# Patient Record
Sex: FEMALE | Race: WHITE | Employment: FULL TIME | URBAN - METROPOLITAN AREA
[De-identification: names, ages, dates, MRNs, and addresses within clinical notes are randomized per-mention and may not be internally consistent; named-entity substitution may affect disease eponyms.]

---

## 2021-02-15 PROBLEM — F41.1 GAD (GENERALIZED ANXIETY DISORDER): Status: ACTIVE | Noted: 2021-02-15

## 2021-02-15 PROBLEM — Z85.6 HISTORY OF LEUKEMIA: Status: ACTIVE | Noted: 2021-02-15

## 2021-02-15 PROBLEM — E55.9 VITAMIN D DEFICIENCY: Status: ACTIVE | Noted: 2021-02-15

## 2021-02-15 PROBLEM — F98.8 ATTENTION DEFICIT DISORDER (ADD) WITHOUT HYPERACTIVITY: Status: ACTIVE | Noted: 2021-02-15

## 2021-04-12 PROBLEM — R10.11 RIGHT UPPER QUADRANT ABDOMINAL PAIN: Status: ACTIVE | Noted: 2021-04-12

## 2021-11-29 ENCOUNTER — APPOINTMENT (OUTPATIENT)
Dept: GENERAL RADIOLOGY | Age: 26
End: 2021-11-29
Attending: NURSE PRACTITIONER
Payer: COMMERCIAL

## 2021-11-29 ENCOUNTER — HOSPITAL ENCOUNTER (OUTPATIENT)
Age: 26
Discharge: HOME | End: 2021-11-29
Payer: COMMERCIAL

## 2021-11-29 VITALS
TEMPERATURE: 98 F | RESPIRATION RATE: 17 BRPM | OXYGEN SATURATION: 99 % | HEART RATE: 98 BPM | SYSTOLIC BLOOD PRESSURE: 121 MMHG | BODY MASS INDEX: 24.11 KG/M2 | WEIGHT: 150 LBS | DIASTOLIC BLOOD PRESSURE: 75 MMHG | HEIGHT: 66 IN

## 2021-11-29 DIAGNOSIS — S92.514A CLOSED NONDISPLACED FRACTURE OF PROXIMAL PHALANX OF LESSER TOE OF RIGHT FOOT, INITIAL ENCOUNTER: Primary | ICD-10-CM

## 2021-11-29 PROCEDURE — 99203 OFFICE O/P NEW LOW 30 MIN: CPT

## 2021-11-29 PROCEDURE — 28510 TREATMENT OF TOE FRACTURE: CPT

## 2021-11-29 PROCEDURE — 73630 X-RAY EXAM OF FOOT: CPT | Performed by: NURSE PRACTITIONER

## 2021-11-29 NOTE — ED INITIAL ASSESSMENT (HPI)
Patient reports that a heavy chair fell on her right foot 2 days ago, and the pain has not subsided.

## 2021-11-29 NOTE — ED PROVIDER NOTES
Patient Seen in: [facility]      History   Patient presents with:   Foot Pain    Stated Complaint: right foor pain,dropped something heavy on it, x2 days    Subjective:   HPI  Patient is 39-year-old female past medical history of leukemia Conjunctiva/sclera: Conjunctivae normal.   Pulmonary:      Effort: Pulmonary effort is normal.   Musculoskeletal:         General: Tenderness present.       Comments: Right Foot Exam;    Very mild ecchymosis dorsal/lateral aspect of right foot  Tenderness t Right foot x-ray; possible healing fracture involving right third proximal phalanx  Impression; toe fracture right third toe proximal phalanx  Plan; Ryan tape/postop shoe.   Ice, elevation, Tylenol or ibuprofen                                     Dispo

## 2022-02-10 PROBLEM — Z47.89 AFTERCARE FOLLOWING SURGERY OF THE MUSCULOSKELETAL SYSTEM: Status: ACTIVE | Noted: 2022-02-10

## 2023-03-18 ENCOUNTER — HOSPITAL ENCOUNTER (EMERGENCY)
Age: 28
Discharge: HOME | End: 2023-03-18
Attending: EMERGENCY MEDICINE
Payer: COMMERCIAL

## 2023-03-18 ENCOUNTER — APPOINTMENT (OUTPATIENT)
Dept: ULTRASOUND IMAGING | Age: 28
End: 2023-03-18
Attending: EMERGENCY MEDICINE
Payer: COMMERCIAL

## 2023-03-18 VITALS
BODY MASS INDEX: 26.52 KG/M2 | WEIGHT: 165 LBS | RESPIRATION RATE: 16 BRPM | SYSTOLIC BLOOD PRESSURE: 113 MMHG | DIASTOLIC BLOOD PRESSURE: 61 MMHG | HEART RATE: 69 BPM | OXYGEN SATURATION: 100 % | HEIGHT: 66 IN | TEMPERATURE: 97 F

## 2023-03-18 DIAGNOSIS — O03.9 MISCARRIAGE AT 8 TO 28 WEEKS GESTATION: Primary | ICD-10-CM

## 2023-03-18 LAB
BILIRUB UR QL STRIP.AUTO: NEGATIVE
COLOR UR AUTO: YELLOW
GLUCOSE UR STRIP.AUTO-MCNC: NEGATIVE MG/DL
KETONES UR STRIP.AUTO-MCNC: 15 MG/DL
LEUKOCYTE ESTERASE UR QL STRIP.AUTO: NEGATIVE
NITRITE UR QL STRIP.AUTO: NEGATIVE
PH UR STRIP.AUTO: 5.5 [PH] (ref 5–8)
PROT UR STRIP.AUTO-MCNC: NEGATIVE MG/DL
RBC UR QL AUTO: NEGATIVE
SP GR UR STRIP.AUTO: >=1.03 (ref 1–1.03)
UROBILINOGEN UR STRIP.AUTO-MCNC: 0.2 MG/DL

## 2023-03-18 PROCEDURE — 76801 OB US < 14 WKS SINGLE FETUS: CPT | Performed by: EMERGENCY MEDICINE

## 2023-03-18 PROCEDURE — 99285 EMERGENCY DEPT VISIT HI MDM: CPT

## 2023-03-18 PROCEDURE — 99284 EMERGENCY DEPT VISIT MOD MDM: CPT

## 2023-03-18 PROCEDURE — 76817 TRANSVAGINAL US OBSTETRIC: CPT | Performed by: EMERGENCY MEDICINE

## 2023-03-18 PROCEDURE — 81001 URINALYSIS AUTO W/SCOPE: CPT | Performed by: EMERGENCY MEDICINE

## 2023-03-18 PROCEDURE — 81015 MICROSCOPIC EXAM OF URINE: CPT | Performed by: EMERGENCY MEDICINE

## 2023-03-18 RX ORDER — MULTIVIT,IRON,MINERALS/LUTEIN
1 TABLET ORAL DAILY
COMMUNITY

## 2023-03-18 NOTE — ED INITIAL ASSESSMENT (HPI)
31 y/o F arrives to ED with c/o lower back pain that started yesterday. Patient denies any vaginal bleeding. Patient reports mild abd cramping; reports she has had these abd cramps throughout the pregnancy. Patient reports US done on March 1st and was normal. Patient reports her OB is through [facility]Dr. Sabi Hurd.   Patient reports GEETA 10/21/23

## 2023-03-24 ENCOUNTER — LAB REQUISITION (OUTPATIENT)
Dept: LAB | Facility: HOSPITAL | Age: 28
End: 2023-03-24
Payer: COMMERCIAL

## 2023-03-24 DIAGNOSIS — O02.1 MISSED ABORTION: ICD-10-CM

## 2023-03-24 PROCEDURE — 88305 TISSUE EXAM BY PATHOLOGIST: CPT | Performed by: OBSTETRICS & GYNECOLOGY

## 2023-10-05 LAB
HEPATITIS B SURFACE ANTIGEN OB: NEGATIVE
HIV RESULT OB: NEGATIVE
RAPID PLASMA REAGIN OB: NONREACTIVE
RH FACTOR OB: POSITIVE

## 2024-01-15 LAB
HIV RESULT OB: NEGATIVE

## 2024-03-06 ENCOUNTER — HOSPITAL ENCOUNTER (OUTPATIENT)
Facility: HOSPITAL | Age: 29
Discharge: HOME | End: 2024-03-06
Attending: OBSTETRICS & GYNECOLOGY | Admitting: OBSTETRICS & GYNECOLOGY
Payer: COMMERCIAL

## 2024-03-06 VITALS
HEART RATE: 104 BPM | TEMPERATURE: 98 F | RESPIRATION RATE: 16 BRPM | SYSTOLIC BLOOD PRESSURE: 106 MMHG | DIASTOLIC BLOOD PRESSURE: 74 MMHG

## 2024-03-06 PROCEDURE — 99213 OFFICE O/P EST LOW 20 MIN: CPT

## 2024-03-06 PROCEDURE — 59025 FETAL NON-STRESS TEST: CPT

## 2024-03-07 NOTE — PROGRESS NOTES
Pt is a 28 year old female admitted to TRG3/TRG3-A.     Chief Complaint   Patient presents with    R/o  Labor     Pt say she is having lower abdominal pain since today morning, denies bleeding and leaking PV, + fetal movement       Pt is  32w2d intra-uterine pregnancy.  History obtained, consents signed. Oriented to room, staff, and plan of care  Report given to Cayla JOHNSON RN .

## 2024-03-07 NOTE — PROGRESS NOTES
Written and verbal discharge instructions given to patient and spouse, questions answered and verbalized understanding of teaching. Patient discharged ambulatory, with all belonging, accompanied by spouse, undelivered, not in active labor. Patient instructed to call the office if patient experiences any urinary symptoms and to follow up at her next scheduled visit. Patient is agreement with plan at this time.   Command auditory hallucinations.

## 2024-03-07 NOTE — NST
Physician Evaluation      NST Interpretation: Reactive    Disposition:   Discharged    Comments:     contractions    Marylu Verdin MD  Nonstress Test   Patient: Annie Byers    Gestation: 32w2d    NST:       Variability: Moderate           Accelerations: Yes           Decelerations: None            Baseline: 150 BPM           Uterine Irritability: Yes           Contractions: Not present                                        Acoustic Stimulator: No           Nonstress Test Interpretation: Reactive           Nonstress Test Second Interpretation: Reactive                     Additional Comments

## 2024-03-07 NOTE — DISCHARGE INSTRUCTIONS
Discharge Instructions    Diet: regular diet/push fluids  Activity: Normal activity         General Instructions    Call your OB doctor if: Fluid leaking from your vagina;Uterine contractions 10 minutes or closer for 1 to 2 hours;Uterine contractions increasing in intensity and frequency;Decrease in fetal movement;Vaginal bleeding;Temperature greater than 100F;Vaginal or rectal pressure  Early labor comfort measures: Drink fluids and eat small light meals;Relax, sleep, take a warm bath or shower for 30 minutes or less;Take a walk           Labor Discharge Instructions    Call your OB doctor if you have any of the following signs:    Period-like cramps that may come and go  Low, dull backache  Pressure in your lower abdomen that may feel like the baby is pushing down  Change in the type or amount of vaginal discharge  Abdominal cramps that may be accompanied by diarrhea  Fluid leaking from your vagina (may or may not be bloody)  Uterine Activity Instructions:

## 2024-04-01 LAB — STREP GP B CULT OB: NEGATIVE

## 2024-04-03 ENCOUNTER — HOSPITAL ENCOUNTER (OUTPATIENT)
Facility: HOSPITAL | Age: 29
Discharge: HOME | End: 2024-04-03
Attending: OBSTETRICS & GYNECOLOGY | Admitting: OBSTETRICS & GYNECOLOGY
Payer: COMMERCIAL

## 2024-04-03 VITALS
WEIGHT: 178 LBS | HEART RATE: 101 BPM | BODY MASS INDEX: 28.61 KG/M2 | TEMPERATURE: 98 F | SYSTOLIC BLOOD PRESSURE: 108 MMHG | RESPIRATION RATE: 16 BRPM | HEIGHT: 66 IN | DIASTOLIC BLOOD PRESSURE: 67 MMHG

## 2024-04-03 PROCEDURE — 59025 FETAL NON-STRESS TEST: CPT

## 2024-04-03 PROCEDURE — 99214 OFFICE O/P EST MOD 30 MIN: CPT

## 2024-04-04 NOTE — DISCHARGE INSTRUCTIONS
Discharge Instructions    Diet: Regular  Activity: Normal activity         General Instructions    Call your OB doctor if: Fluid leaking from your vagina;Uterine contractions 10 minutes or closer for 1 to 2 hours;Uterine contractions increasing in intensity and frequency;Decrease in fetal movement;Vaginal bleeding;Temperature greater than 100F;Vaginal or rectal pressure  Early labor comfort measures: Drink fluids and eat small light meals;Relax, sleep, take a warm bath or shower for 30 minutes or less;Take a walk    Follow up at next scheduled appointment

## 2024-04-04 NOTE — NST
Nonstress Test   Patient: Annie Byers    Gestation: 36w2d    NST:       Variability: Moderate           Accelerations: Yes           Decelerations: Variable (x1, MD aware)          Baseline: 145 BPM           Uterine Irritability: No           Contractions: Irregular                                        Acoustic Stimulator: No           Nonstress Test Interpretation: Reactive           Nonstress Test Second Interpretation: Reactive                     Additional Comments      Physician Evaluation      NST Interpretation: Reactive    Disposition:   Discharged    Comments:    none    Yelena Vitale MD

## 2024-04-04 NOTE — PROGRESS NOTES
Pt is a 28 year old female admitted to TRG4/TRG4-A.     Chief Complaint   Patient presents with    R/o Rom      Pt is  36w2d intra-uterine pregnancy.  History obtained, consents signed. Oriented to room, staff, and plan of care. Pt c/o possible SROM at 0830. States she's been leaking small amounts of watery discharge all day, had to change her underwear twice. Denies large gush or need for a pad. Denies regular ctx, vaginal bleeding. No obvious LOF upon arrival.     EFM tested and applied, FHTs tracing and audible in 160s. Abdomen soft and non-tender.

## 2024-04-04 NOTE — PROGRESS NOTES
Discharge instructions given and explained, pt verbalizes understanding and agrees. Aware she is to f/u in office at next scheduled appointment. Pt ambulatory and discharged home accompanied by significant other.

## 2024-04-16 ENCOUNTER — TELEPHONE (OUTPATIENT)
Dept: OBGYN UNIT | Facility: HOSPITAL | Age: 29
End: 2024-04-16

## 2024-04-19 ENCOUNTER — HOSPITAL ENCOUNTER (INPATIENT)
Facility: HOSPITAL | Age: 29
LOS: 3 days | Discharge: HOME | End: 2024-04-22
Attending: OBSTETRICS & GYNECOLOGY | Admitting: OBSTETRICS & GYNECOLOGY
Payer: COMMERCIAL

## 2024-04-19 PROBLEM — Z34.90 PREGNANCY (HCC): Status: ACTIVE | Noted: 2024-04-19

## 2024-04-19 LAB
ANTIBODY SCREEN: NEGATIVE
BASOPHILS # BLD AUTO: 0.02 X10(3) UL (ref 0–0.2)
BASOPHILS NFR BLD AUTO: 0.2 %
EOSINOPHIL # BLD AUTO: 0.02 X10(3) UL (ref 0–0.7)
EOSINOPHIL NFR BLD AUTO: 0.2 %
ERYTHROCYTE [DISTWIDTH] IN BLOOD BY AUTOMATED COUNT: 13.1 %
HCT VFR BLD AUTO: 33.3 %
HGB BLD-MCNC: 11.7 G/DL
HIV 1+2 AB+HIV1 P24 AG SERPL QL IA: NONREACTIVE
IMM GRANULOCYTES # BLD AUTO: 0.06 X10(3) UL (ref 0–1)
IMM GRANULOCYTES NFR BLD: 0.6 %
LYMPHOCYTES # BLD AUTO: 1.82 X10(3) UL (ref 1–4)
LYMPHOCYTES NFR BLD AUTO: 18.9 %
MCH RBC QN AUTO: 30.9 PG (ref 26–34)
MCHC RBC AUTO-ENTMCNC: 35.1 G/DL (ref 31–37)
MCV RBC AUTO: 87.9 FL
MONOCYTES # BLD AUTO: 0.52 X10(3) UL (ref 0.1–1)
MONOCYTES NFR BLD AUTO: 5.4 %
NEUTROPHILS # BLD AUTO: 7.17 X10 (3) UL (ref 1.5–7.7)
NEUTROPHILS # BLD AUTO: 7.17 X10(3) UL (ref 1.5–7.7)
NEUTROPHILS NFR BLD AUTO: 74.7 %
PLATELET # BLD AUTO: 203 10(3)UL (ref 150–450)
RBC # BLD AUTO: 3.79 X10(6)UL
RH BLOOD TYPE: POSITIVE
T PALLIDUM AB SER QL IA: NONREACTIVE
WBC # BLD AUTO: 9.6 X10(3) UL (ref 4–11)

## 2024-04-19 PROCEDURE — 86850 RBC ANTIBODY SCREEN: CPT | Performed by: OBSTETRICS & GYNECOLOGY

## 2024-04-19 PROCEDURE — 99214 OFFICE O/P EST MOD 30 MIN: CPT

## 2024-04-19 PROCEDURE — 3E0DXGC INTRODUCTION OF OTHER THERAPEUTIC SUBSTANCE INTO MOUTH AND PHARYNX, EXTERNAL APPROACH: ICD-10-PCS | Performed by: OBSTETRICS & GYNECOLOGY

## 2024-04-19 PROCEDURE — 85025 COMPLETE CBC W/AUTO DIFF WBC: CPT | Performed by: OBSTETRICS & GYNECOLOGY

## 2024-04-19 PROCEDURE — 86701 HIV-1ANTIBODY: CPT | Performed by: OBSTETRICS & GYNECOLOGY

## 2024-04-19 PROCEDURE — 86900 BLOOD TYPING SEROLOGIC ABO: CPT | Performed by: OBSTETRICS & GYNECOLOGY

## 2024-04-19 PROCEDURE — 86901 BLOOD TYPING SEROLOGIC RH(D): CPT | Performed by: OBSTETRICS & GYNECOLOGY

## 2024-04-19 PROCEDURE — 86780 TREPONEMA PALLIDUM: CPT | Performed by: OBSTETRICS & GYNECOLOGY

## 2024-04-19 RX ORDER — IBUPROFEN 600 MG/1
600 TABLET ORAL ONCE AS NEEDED
Status: COMPLETED | OUTPATIENT
Start: 2024-04-19 | End: 2024-04-20

## 2024-04-19 RX ORDER — NALBUPHINE HYDROCHLORIDE 10 MG/ML
2.5 INJECTION, SOLUTION INTRAMUSCULAR; INTRAVENOUS; SUBCUTANEOUS
Status: DISCONTINUED | OUTPATIENT
Start: 2024-04-19 | End: 2024-04-20

## 2024-04-19 RX ORDER — TERBUTALINE SULFATE 1 MG/ML
0.25 INJECTION, SOLUTION SUBCUTANEOUS AS NEEDED
Status: DISCONTINUED | OUTPATIENT
Start: 2024-04-19 | End: 2024-04-20

## 2024-04-19 RX ORDER — ONDANSETRON 2 MG/ML
4 INJECTION INTRAMUSCULAR; INTRAVENOUS EVERY 6 HOURS PRN
Status: DISCONTINUED | OUTPATIENT
Start: 2024-04-19 | End: 2024-04-20

## 2024-04-19 RX ORDER — BUPIVACAINE HCL/0.9 % NACL/PF 0.25 %
5 PLASTIC BAG, INJECTION (ML) EPIDURAL AS NEEDED
Status: DISCONTINUED | OUTPATIENT
Start: 2024-04-19 | End: 2024-04-20

## 2024-04-19 RX ORDER — ACETAMINOPHEN 500 MG
500 TABLET ORAL EVERY 6 HOURS PRN
Status: DISCONTINUED | OUTPATIENT
Start: 2024-04-19 | End: 2024-04-20

## 2024-04-19 RX ORDER — SODIUM CHLORIDE, SODIUM LACTATE, POTASSIUM CHLORIDE, CALCIUM CHLORIDE 600; 310; 30; 20 MG/100ML; MG/100ML; MG/100ML; MG/100ML
INJECTION, SOLUTION INTRAVENOUS CONTINUOUS
Status: DISCONTINUED | OUTPATIENT
Start: 2024-04-19 | End: 2024-04-20

## 2024-04-19 RX ORDER — DEXTROSE, SODIUM CHLORIDE, SODIUM LACTATE, POTASSIUM CHLORIDE, AND CALCIUM CHLORIDE 5; .6; .31; .03; .02 G/100ML; G/100ML; G/100ML; G/100ML; G/100ML
INJECTION, SOLUTION INTRAVENOUS AS NEEDED
Status: DISCONTINUED | OUTPATIENT
Start: 2024-04-19 | End: 2024-04-20

## 2024-04-19 RX ORDER — HYDROMORPHONE HYDROCHLORIDE 1 MG/ML
1 INJECTION, SOLUTION INTRAMUSCULAR; INTRAVENOUS; SUBCUTANEOUS EVERY 2 HOUR PRN
Status: DISCONTINUED | OUTPATIENT
Start: 2024-04-19 | End: 2024-04-20

## 2024-04-19 RX ORDER — ACETAMINOPHEN 500 MG
1000 TABLET ORAL EVERY 6 HOURS PRN
Status: DISCONTINUED | OUTPATIENT
Start: 2024-04-19 | End: 2024-04-20

## 2024-04-19 RX ORDER — CITRIC ACID/SODIUM CITRATE 334-500MG
30 SOLUTION, ORAL ORAL AS NEEDED
Status: DISCONTINUED | OUTPATIENT
Start: 2024-04-19 | End: 2024-04-20

## 2024-04-19 NOTE — H&P
[facility]  Labor and Delivery Prenatal History and Physical Interval Addendum  Please see full Prenatal Record for this pregnancy      SUBJECTIVE:    Interval History:     This is a pregnancy at 38 4/7 weeks admitted for labor induction.  She arrived in triage to assess possible labor, but contractions are mild.  During course of her evaluation, some variable FHR decelerations were noted.    Growth ultrasound 5 w ago 37% and earlier this week normal growth and HASEEB 16.    OBJECTIVE:    The patient is comfortable    Fetal Surveillance:  Fetal heart variability: moderate with accelerations qualifying for reactivity. However, throughout over an hour of monitoring, she has instances of usually brief but occasionally slightly longer variable decelerations with good recovery.    Cervix:  Per RN initial assessment, cervix FT dilated.  On my re-exam, cervix very posterior and cannot verify dilation, feels closed  Effacement 50%  Station -1  cephalic      ASSESSMENT/PLAN:    38 weeks, mild contractions, persistent occasional variable FHR decelerations.  I discussed w/pt and partner the pros and cons of observing her condition versus initiating labor induction. At this gestational age, I feel it is safer to proceed with induction and they are in agreement.   Gbs status: neg  Problems pertinent to admission:  - suspected absent 5th digit on fetal left foot on earlier ultrasound. In February, however, all digits appeared normal  - maternal hx anxiety

## 2024-04-19 NOTE — PLAN OF CARE
Problem: BIRTH - VAGINAL/ SECTION  Goal: Fetal and maternal status remain reassuring during the birth process  Description: INTERVENTIONS:  - Monitor vital signs  - Monitor fetal heart rate  - Monitor uterine activity  - Monitor labor progression (vaginal delivery)  - DVT prophylaxis (C/S delivery)  - Surgical antibiotic prophylaxis (C/S delivery)  Outcome: Progressing     Problem: PAIN - ADULT  Goal: Verbalizes/displays adequate comfort level or patient's stated pain goal  Description: INTERVENTIONS:  - Encourage pt to monitor pain and request assistance  - Assess pain using appropriate pain scale  - Administer analgesics based on type and severity of pain and evaluate response  - Implement non-pharmacological measures as appropriate and evaluate response  - Consider cultural and social influences on pain and pain management  - Manage/alleviate anxiety  - Utilize distraction and/or relaxation techniques  - Monitor for opioid side effects  - Notify MD/LIP if interventions unsuccessful or patient reports new pain  - Anticipate increased pain with activity and pre-medicate as appropriate  Outcome: Progressing     Problem: ANXIETY  Goal: Will report anxiety at manageable levels  Description: INTERVENTIONS:  - Administer medication as ordered  - Teach and rehearse alternative coping skills  - Provide emotional support with 1:1 interaction with staff  Outcome: Progressing     Problem: Patient/Family Goals  Goal: Patient/Family Long Term Goal  Description: Patient's Long Term Goal: Uncomplicated vaginal delivery    Interventions:  VS per protocol  I&O  Ice chips and sips as tolerated  EFM per protocol  Maintain IV as ordered  Antibiotics as needed per protocol  Informed consent    Interventions:  -   - See additional Care Plan goals for specific interventions  Outcome: Progressing  Goal: Patient/Family Short Term Goal  Description: Patient's Short Term Goal: Adequate pain control with delivery of  infant  Interventions:  Pain assessment scores as ordered  Patient scores pain a \"3\" or less  Multidisciplinary care   Nonpharmacologic comfort measures    Interventions:   -   - See additional Care Plan goals for specific interventions  Outcome: Progressing

## 2024-04-19 NOTE — PROGRESS NOTES
Pt is a 28 year old female admitted to TRG3/TRG3-A.     Chief Complaint   Patient presents with    R/o Labor     Patient presents with regular UC since 0900.  Denies bleeding or LOF.  + FM      Pt is  38w4d intra-uterine pregnancy.  History obtained, consents signed. Oriented to room, staff, and plan of care.

## 2024-04-20 ENCOUNTER — ANESTHESIA (OUTPATIENT)
Dept: OBGYN UNIT | Facility: HOSPITAL | Age: 29
End: 2024-04-20
Payer: COMMERCIAL

## 2024-04-20 ENCOUNTER — ANESTHESIA EVENT (OUTPATIENT)
Dept: OBGYN UNIT | Facility: HOSPITAL | Age: 29
End: 2024-04-20
Payer: COMMERCIAL

## 2024-04-20 PROCEDURE — 0HQ9XZZ REPAIR PERINEUM SKIN, EXTERNAL APPROACH: ICD-10-PCS | Performed by: OBSTETRICS & GYNECOLOGY

## 2024-04-20 PROCEDURE — 3E033VJ INTRODUCTION OF OTHER HORMONE INTO PERIPHERAL VEIN, PERCUTANEOUS APPROACH: ICD-10-PCS | Performed by: OBSTETRICS & GYNECOLOGY

## 2024-04-20 RX ORDER — ACETAMINOPHEN 500 MG
1000 TABLET ORAL EVERY 6 HOURS PRN
Status: DISCONTINUED | OUTPATIENT
Start: 2024-04-20 | End: 2024-04-22

## 2024-04-20 RX ORDER — DOCUSATE SODIUM 100 MG/1
100 CAPSULE, LIQUID FILLED ORAL
Status: DISCONTINUED | OUTPATIENT
Start: 2024-04-20 | End: 2024-04-22

## 2024-04-20 RX ORDER — SIMETHICONE 80 MG
80 TABLET,CHEWABLE ORAL 3 TIMES DAILY PRN
Status: DISCONTINUED | OUTPATIENT
Start: 2024-04-20 | End: 2024-04-22

## 2024-04-20 RX ORDER — IBUPROFEN 600 MG/1
600 TABLET ORAL EVERY 6 HOURS
Status: DISCONTINUED | OUTPATIENT
Start: 2024-04-20 | End: 2024-04-22

## 2024-04-20 RX ORDER — BISACODYL 10 MG
10 SUPPOSITORY, RECTAL RECTAL ONCE AS NEEDED
Status: DISCONTINUED | OUTPATIENT
Start: 2024-04-20 | End: 2024-04-22

## 2024-04-20 RX ORDER — LIDOCAINE HYDROCHLORIDE 10 MG/ML
INJECTION, SOLUTION EPIDURAL; INFILTRATION; INTRACAUDAL; PERINEURAL AS NEEDED
Status: DISCONTINUED | OUTPATIENT
Start: 2024-04-20 | End: 2024-04-20 | Stop reason: SURG

## 2024-04-20 RX ORDER — ACETAMINOPHEN 500 MG
500 TABLET ORAL EVERY 6 HOURS PRN
Status: DISCONTINUED | OUTPATIENT
Start: 2024-04-20 | End: 2024-04-22

## 2024-04-20 RX ORDER — LIDOCAINE HYDROCHLORIDE AND EPINEPHRINE 15; 5 MG/ML; UG/ML
INJECTION, SOLUTION EPIDURAL AS NEEDED
Status: DISCONTINUED | OUTPATIENT
Start: 2024-04-20 | End: 2024-04-20 | Stop reason: SURG

## 2024-04-20 RX ADMIN — LIDOCAINE HYDROCHLORIDE 10 MG: 10 INJECTION, SOLUTION EPIDURAL; INFILTRATION; INTRACAUDAL; PERINEURAL at 13:07:00

## 2024-04-20 RX ADMIN — LIDOCAINE HYDROCHLORIDE AND EPINEPHRINE 5 ML: 15; 5 INJECTION, SOLUTION EPIDURAL at 13:17:00

## 2024-04-20 NOTE — PROGRESS NOTES
Labor Progress Note  Feeling mild cramping, tolerating well at this time without pain interventions.  No leaking of fluid or vaginal bleeding.  No other complaints     Temp:  [97.4 °F (36.3 °C)-98.3 °F (36.8 °C)] 97.4 °F (36.3 °C)  Pulse:  [] 93  Resp:  [16-18] 16  BP: (116-123)/(71-77) 116/77  FHT:  baseline 135bpm, moderate variability, accels appreciated, no decels   [place]onda:  Contractions not tracing well   SVE: 0-1cm/0%/-3sta    Recent Labs   Lab 24  1629   RBC 3.79*   HGB 11.7*   HCT 33.3*   MCV 87.9   MCH 30.9   MCHC 35.1   RDW 13.1   NEPRELIM 7.17   WBC 9.6   .0       ASSESSMENT/PLAN:  Annie Byers is a 28 year old  at 38w4d admitted for IOL due to fetal heart rate decelerations.    FHT currently category 1.  Cytotec #1 placed at 1609, reassess SVE after .  Anticipate placement of second cytotec.   Analgesics/epidural at patient request.     Willem Grijalva,      Hospital stay: 0

## 2024-04-20 NOTE — PLAN OF CARE
Problem: BIRTH - VAGINAL/ SECTION  Goal: Fetal and maternal status remain reassuring during the birth process  Description: INTERVENTIONS:  - Monitor vital signs  - Monitor fetal heart rate  - Monitor uterine activity  - Monitor labor progression (vaginal delivery)  - DVT prophylaxis (C/S delivery)  - Surgical antibiotic prophylaxis (C/S delivery)  2024 by Rhys Howard RN  Outcome: Progressing  2024 by Rhys Howard RN  Outcome: Progressing     Problem: PAIN - ADULT  Goal: Verbalizes/displays adequate comfort level or patient's stated pain goal  Description: INTERVENTIONS:  - Encourage pt to monitor pain and request assistance  - Assess pain using appropriate pain scale  - Administer analgesics based on type and severity of pain and evaluate response  - Implement non-pharmacological measures as appropriate and evaluate response  - Consider cultural and social influences on pain and pain management  - Manage/alleviate anxiety  - Utilize distraction and/or relaxation techniques  - Monitor for opioid side effects  - Notify MD/LIP if interventions unsuccessful or patient reports new pain  - Anticipate increased pain with activity and pre-medicate as appropriate  2024 by Rhys Howard RN  Outcome: Progressing  2024 by Rhys Howard RN  Outcome: Progressing     Problem: ANXIETY  Goal: Will report anxiety at manageable levels  Description: INTERVENTIONS:  - Administer medication as ordered  - Teach and rehearse alternative coping skills  - Provide emotional support with 1:1 interaction with staff  2024 by hRys Howard RN  Outcome: Progressing  2024 by Rhys Howard RN  Outcome: Progressing     Problem: Patient/Family Goals  Goal: Patient/Family Long Term Goal  Description: Patient's Long Term Goal: Uncomplicated vaginal delivery     Interventions:   VS per protocol   I&O   Ice chips and sips as tolerated   EFM per protocol   Maintain IV as ordered    Antibiotics as needed per protocol   Informed consent   - See additional Care Plan goals for specific interventions  4/20/2024 0234 by Rhys Howard, RN  Outcome: Progressing  4/20/2024 0233 by Rhys Howard, RN  Outcome: Progressing  Goal: Patient/Family Short Term Goal  Description: Patient's Short Term Goal: pain management     Interventions:   - breathing techniques  -repositioning  -relaxation techniques   - See additional Care Plan goals for specific interventions  4/20/2024 0234 by Rhys Howard, RN  Outcome: Progressing  4/20/2024 0233 by Rhys Howard, RN  Outcome: Progressing

## 2024-04-20 NOTE — PROGRESS NOTES
Pt is a 28 year old female admitted to 113/113-A.     Chief Complaint   Patient presents with    R/o Labor     Patient presents with regular UC since 0900.  Denies bleeding or LOF.  + FM      Pt is  38w4d intra-uterine pregnancy.  History obtained, consents signed. Oriented to room, staff, and plan of care.     EFM tested and applied. Abdomen soft and non-tender.

## 2024-04-20 NOTE — L&D DELIVERY NOTE
Jaye Byers [[id number]]      Labor Events     labor?: No   steroids?: None  Antibiotics received during labor?: No  Rupture date/time: 2024 1215     Rupture type: SROM  Fluid color: Clear  Labor type: Induced Onset of Labor  Induction: Misoprostol, Oxytocin  Indications for induction: Fetal Heart Rate or Rhythm Abnormality  Induction comment: variables       Labor Event Times    Labor onset date/time: 2024 1345  Dilation complete date/time: 2024 1803        Presentation    No data filed       Operative Delivery    Operative Vaginal Delivery: No                Shoulder Dystocia    Shoulder Dystocia: No       Anesthesia    Method: Epidural               Delivery      Head delivery date/time: 2024 18:46:28   Delivery date/time:  24 18:46:43   Delivery type: Normal spontaneous vaginal delivery    Details:     Delivery location: delivery room       Delivery Providers    Delivering Clinician: Fabio Valadez MD   Delivery personnel:  Provider Role   Vanita Voss, RN Baby Nurse   Dori Bansal, RN Delivery Nurse             Cord    Vessels: 3 Vessels  Complications: Nuchal  # of loops: 1  Timed cord clamping: Yes  Time in sec: 30  Cord blood disposition: to lab  Gases sent?: No        Measurements    No data filed       Placenta    Date/time: 2024 1849  Removal: Spontaneous  Appearance: Intact  Disposition: held for future pathology       Apgars    Living status: Living   Apgar Scoring Key:    0 1 2    Skin color Blue or pale Acrocyanotic Completely pink    Heart rate Absent <100 bpm >100 bpm    Reflex irritability No response Grimace Cry or active withdrawal    Muscle tone Limp Some flexion Active motion    Respiratory effort Absent Weak cry; hypoventilation Good, crying              1 Minute:  5 Minute:  10 Minute:  15 Minute:  20 Minute:      Skin color:        Heart rate:        Reflex irritablity:        Muscle tone:        Respiratory effort:         Total:                Skin to Skin    No data filed       Vaginal Count    No data filed       Lacerations      Perineal lacerations: 1st Repaired?: Yes     Periurethral laceration: right Repaired?: Yes               Pt is a28 year old y/o, [id number] 38w5d . Normal spontaneous vaginal delivery of female infant. TOB-18:46, Apgar-7,9 .  First degree perineal laceration repaired with 3-0 vicryl. Shannon-clitoral lac repaired with 4-0 vicryl. Placenta delivered spontaneously and intact.  Cord vessel x3.  epidural for anesthesia.  EBL-150cc.  No complications

## 2024-04-20 NOTE — ANESTHESIA PROCEDURE NOTES
Labor Analgesia    Date/Time: 4/20/2024 1:05 PM    Performed by: Gregorio Scott MD  Authorized by: Gregorio Scott MD      General Information and Staff    Start Time:  4/20/2024 1:05 PM  End Time:  4/20/2024 1:17 PM  Anesthesiologist:  Gregorio Scott MD  Performed by:  Anesthesiologist  Patient Location:  OB  Site Identification: surface landmarks    Reason for Block: labor epidural    Preanesthetic Checklist: patient identified, IV checked, risks and benefits discussed, monitors and equipment checked, pre-op evaluation, timeout performed, IV bolus, anesthesia consent and sterile technique used      Procedure Details    Patient Position:  Sitting  Prep: ChloraPrep    Monitoring:  Heart rate and continuous pulse ox  Approach:  Midline    Epidural Needle    Injection Technique:  ROSA air  Needle Type:  Tuohy  Needle Gauge:  17 G  Needle Length:  3.375 in  Needle Insertion Depth:  6  Location:  L3-4    Spinal Needle      Catheter    Catheter Type:  End hole  Catheter Size:  19 G  Test Dose:  Negative    Assessment    Sensory Level:  T8    Additional Comments

## 2024-04-20 NOTE — ANESTHESIA PREPROCEDURE EVALUATION
PRE-OP EVALUATION    Patient Name: Annie Byers    Admit Diagnosis: pregnancy  Pregnancy (HCC)    Pre-op Diagnosis: * No pre-op diagnosis entered *        Anesthesia Procedure: LABOR ANALGESIA    * No surgeons found in log *    Pre-op vitals reviewed.  Temp: 97.8 °F (36.6 °C)  Pulse: 80  Resp: 18  BP: 106/52     Body mass index is 28.41 kg/m².    Current medications reviewed.  Hospital Medications:  • lactated ringers infusion   Intravenous Continuous   • dextrose in lactated ringers 5% infusion   Intravenous PRN   • lactated ringers IV bolus 500 mL  500 mL Intravenous PRN   • acetaminophen (Tylenol Extra Strength) tab 500 mg  500 mg Oral Q6H PRN   • acetaminophen (Tylenol Extra Strength) tab 1,000 mg  1,000 mg Oral Q6H PRN   • ibuprofen (Motrin) tab 600 mg  600 mg Oral Once PRN   • ondansetron (Zofran) 4 MG/2ML injection 4 mg  4 mg Intravenous Q6H PRN   • oxyTOCIN in sodium chloride 0.9% (Pitocin) 30 Units/500mL infusion premix  62.5-900 ren-units/min Intravenous Continuous   • terbutaline (Brethine) 1 MG/ML injection 0.25 mg  0.25 mg Subcutaneous PRN   • sodium citrate-citric acid (Bicitra) 500-334 MG/5ML oral solution 30 mL  30 mL Oral PRN   • misoprostol (CYTOTEC) partial tablets 25 mcg  25 mcg Vaginal 6 times per day   • oxyTOCIN in sodium chloride 0.9% (Pitocin) 30 Units/500mL infusion premix  0.5-20 ren-units/min Intravenous Continuous   • HYDROmorphone (Dilaudid) 1 MG/ML injection 1 mg  1 mg Intravenous Q2H PRN   • [COMPLETED] lactated ringers IV bolus 1,000 mL  1,000 mL Intravenous Once   • fentaNYL-bupivacaine 2 mcg/mL-0.125% in sodium chloride 0.9% 100 mL EPIDURAL infusion premix  12 mL/hr Epidural Continuous   • [COMPLETED] fentaNYL (Sublimaze) 50 mcg/mL injection 100 mcg  100 mcg Epidural Once   • EPHEDrine (PF) 25 MG/5 ML injection 5 mg  5 mg Intravenous PRN   • nalbuphine (Nubain) 10 mg/mL injection 2.5 mg  2.5 mg Intravenous Q15 Min PRN       Outpatient Medications:     Medications Prior to  Admission   Medication Sig Dispense Refill Last Dose   • sertraline (ZOLOFT) 50 MG Oral Tab Take 1 tablet (50 mg total) by mouth every evening. 90 tablet 0 4/18/2024   • Calcium Carbonate 600 MG Oral Tab Take 1 tablet (600 mg total) by mouth.   4/18/2024   • Multiple Vitamins-Minerals (MULTIPLE VITAMINS/WOMENS) Oral Tab Take 1 tablet by mouth daily.   4/18/2024   • ergocalciferol 1.25 MG (96503 UT) Oral Cap Take 1 capsule (50,000 Units total) by mouth once a week. 16 capsule 0 4/18/2024   • Prenatal Vit-Fe Fumarate-FA (MULTI PRENATAL) 27-0.8 MG Oral Tab Take 1 tablet by mouth daily.      • cetirizine (ZYRTEC ALLERGY) 10 MG Oral Tab Take 1 tablet (10 mg total) by mouth daily. (Patient not taking: Reported on 3/18/2023) 30 tablet 1        Allergies: Patient has no known allergies.      Anesthesia Evaluation    Patient summary reviewed.    Anesthetic Complications           GI/Hepatic/Renal                                 Cardiovascular                                                       Endo/Other                                  Pulmonary                           Neuro/Psych      (+) depression                              Past Surgical History:   Procedure Laterality Date   • Foot surgery     • Port a cath access total     • Toe surgery Right 01/2022     Social History     Socioeconomic History   • Marital status:    Tobacco Use   • Smoking status: Never     Passive exposure: Never   • Smokeless tobacco: Never   Vaping Use   • Vaping status: Never Used   Substance and Sexual Activity   • Alcohol use: Not Currently     Comment: rare alcohol use, 2 x month   • Drug use: No     History   Drug Use No     Available pre-op labs reviewed.  Lab Results   Component Value Date    WBC 9.6 04/19/2024    RBC 3.79 (L) 04/19/2024    HGB 11.7 (L) 04/19/2024    HCT 33.3 (L) 04/19/2024    MCV 87.9 04/19/2024    MCH 30.9 04/19/2024    MCHC 35.1 04/19/2024    RDW 13.1 04/19/2024    .0 04/19/2024                Airway      Mallampati: II  Mouth opening: 3 FB  TM distance: > 6 cm  Neck ROM: full Cardiovascular    Cardiovascular exam normal.  Rhythm: regular  Rate: normal     Dental             Pulmonary    Pulmonary exam normal.                 Other findings        ASA: 2   Plan: epidural  NPO status verified and patient meets guidelines.          Plan/risks discussed with: patient and spouse            Present on Admission:  **None**

## 2024-04-20 NOTE — PROGRESS NOTES
Labor Progress Note  Feeling contractions regularly now, uncomfortable.  Just received dilaudid.  No leaking of fluid.  Jenny too much for another cytotec.  FB offered, she declines at this time.     Temp:  [97.4 °F (36.3 °C)-98.4 °F (36.9 °C)] 98.2 °F (36.8 °C)  Pulse:  [] 60  Resp:  [16-18] 18  BP: (116-123)/(66-77) 120/67  FHT:  baseline 125s, mod variability, accels appreciated, no decels  [place]:  contractions q2 minutes   SVE: 1cm/50%/-3sta    Recent Labs   Lab 24  1629   RBC 3.79*   HGB 11.7*   HCT 33.3*   MCV 87.9   MCH 30.9   MCHC 35.1   RDW 13.1   NEPRELIM 7.17   WBC 9.6   .0       ASSESSMENT/PLAN:  Annie Byers is a 28 year old  at 38w5d admitted with IOL due to fetal heart rate decelerations.    S/p cytotec x3  Cervix /50/-3, ctx Q2min  Jenny too much for another cytotec, FB offered and declined.  Will expectantly manage for next 1-2 hrs.     Willem Grijalva DO     Hospital stay: 1

## 2024-04-20 NOTE — PROGRESS NOTES
Report given to KANCHAN Meadows and KANCHAN Waldron. POC discussed with patient at bedside. All questions answered at this time.

## 2024-04-20 NOTE — PROGRESS NOTES
SUBJECTIVE:   pt without complaints.  Having some mild to moderate contractions.     OBJECTIVE:  VS:  height is 5' 6\" (1.676 m) and weight is 176 lb (79.8 kg). Her oral temperature is 97.8 °F (36.6 °C). Her blood pressure is 116/61 and her pulse is 87. Her respiration is 16.     Fetal Surveillance:  Fetal heart variability: moderate  Fetal Heart Rate accelerations: yes  Baseline FHR: 140 per minute  Uterine contractions: regular, every 3-5 minutes    Cervix:  Dilation:1cm  Effacement: 80%  Station:-1    ASSESSMENT/PLAN:    1.28 year old y/o, [id number] 38w5d  2. FWB-reassuring.   3. Start pitocin in 1 hour  4. Expect

## 2024-04-20 NOTE — PLAN OF CARE
Problem: BIRTH - VAGINAL/ SECTION  Goal: Fetal and maternal status remain reassuring during the birth process  Description: INTERVENTIONS:  - Monitor vital signs  - Monitor fetal heart rate  - Monitor uterine activity  - Monitor labor progression (vaginal delivery)  - DVT prophylaxis (C/S delivery)  - Surgical antibiotic prophylaxis (C/S delivery)  Outcome: Progressing     Problem: PAIN - ADULT  Goal: Verbalizes/displays adequate comfort level or patient's stated pain goal  Description: INTERVENTIONS:  - Encourage pt to monitor pain and request assistance  - Assess pain using appropriate pain scale  - Administer analgesics based on type and severity of pain and evaluate response  - Implement non-pharmacological measures as appropriate and evaluate response  - Consider cultural and social influences on pain and pain management  - Manage/alleviate anxiety  - Utilize distraction and/or relaxation techniques  - Monitor for opioid side effects  - Notify MD/LIP if interventions unsuccessful or patient reports new pain  - Anticipate increased pain with activity and pre-medicate as appropriate  Outcome: Progressing     Problem: ANXIETY  Goal: Will report anxiety at manageable levels  Description: INTERVENTIONS:  - Administer medication as ordered  - Teach and rehearse alternative coping skills  - Provide emotional support with 1:1 interaction with staff  Outcome: Progressing     Problem: Patient/Family Goals  Goal: Patient/Family Long Term Goal  Description: Patient's Long Term Goal: Uncomplicated vaginal delivery     Interventions:   VS per protocol   I&O   Ice chips and sips as tolerated   EFM per protocol   Maintain IV as ordered   Antibiotics as needed per protocol   Informed consent   - See additional Care Plan goals for specific interventions  Outcome: Progressing  Goal: Patient/Family Short Term Goal  Description: Patient's Short Term Goal: pain management     Interventions:   - breathing  techniques  -repositioning  -relaxation techniques   - See additional Care Plan goals for specific interventions  Outcome: Progressing

## 2024-04-21 LAB
BASOPHILS # BLD AUTO: 0.02 X10(3) UL (ref 0–0.2)
BASOPHILS NFR BLD AUTO: 0.1 %
EOSINOPHIL # BLD AUTO: 0.03 X10(3) UL (ref 0–0.7)
EOSINOPHIL NFR BLD AUTO: 0.2 %
ERYTHROCYTE [DISTWIDTH] IN BLOOD BY AUTOMATED COUNT: 13 %
HCT VFR BLD AUTO: 31.5 %
HGB BLD-MCNC: 10.2 G/DL
IMM GRANULOCYTES # BLD AUTO: 0.08 X10(3) UL (ref 0–1)
IMM GRANULOCYTES NFR BLD: 0.6 %
LYMPHOCYTES # BLD AUTO: 1.8 X10(3) UL (ref 1–4)
LYMPHOCYTES NFR BLD AUTO: 13.4 %
MCH RBC QN AUTO: 30.2 PG (ref 26–34)
MCHC RBC AUTO-ENTMCNC: 32.4 G/DL (ref 31–37)
MCV RBC AUTO: 93.2 FL
MONOCYTES # BLD AUTO: 0.86 X10(3) UL (ref 0.1–1)
MONOCYTES NFR BLD AUTO: 6.4 %
NEUTROPHILS # BLD AUTO: 10.62 X10 (3) UL (ref 1.5–7.7)
NEUTROPHILS # BLD AUTO: 10.62 X10(3) UL (ref 1.5–7.7)
NEUTROPHILS NFR BLD AUTO: 79.3 %
PLATELET # BLD AUTO: 193 10(3)UL (ref 150–450)
RBC # BLD AUTO: 3.38 X10(6)UL
WBC # BLD AUTO: 13.4 X10(3) UL (ref 4–11)

## 2024-04-21 PROCEDURE — 85025 COMPLETE CBC W/AUTO DIFF WBC: CPT | Performed by: OBSTETRICS & GYNECOLOGY

## 2024-04-21 NOTE — PLAN OF CARE
Problem: POSTPARTUM  Goal: Optimize infant feeding at the breast  Description: INTERVENTIONS:  - Initiate breast feeding within first hour after birth.   - Monitor effectiveness of current breast feeding efforts.  - Assess support systems available to mother/family.  - Identify cultural beliefs/practices regarding lactation, letdown techniques, maternal food preferences.  - Assess mother's knowledge and previous experience with breast feeding.  - Provide information as needed about early infant feeding cues (e.g., rooting, lip smacking, sucking fingers/hand) versus late cue of crying.  - Discuss/demonstrate breast feeding aids (e.g., infant sling, nursing footstool/pillows, and breast pumps).  - Encourage mother/other family members to express feelings/concerns, and actively listen.  - Educate father/SO about benefits of breast feeding and how to manage common lactation challenges.  - Recommend avoidance of specific medications or substances incompatible with breast feeding.  - Assess and monitor for signs of nipple pain/trauma.  - Instruct and provide assistance with proper latch.  - Review techniques for milk expression (breast pumping) and storage of breast milk. Provide pumping equipment/supplies, instructions and assistance, as needed.  - Encourage rooming-in and breast feeding on demand.  - Encourage skin-to-skin contact.  - Provide LC support as needed.  - Assess for and manage engorgement.  - Provide breast feeding education handouts and information on community breast feeding support.   Outcome: Progressing  Goal: Appropriate maternal -  bonding  Description: INTERVENTIONS:  - Assess caregiver- interactions.  - Assess caregiver's emotional status and coping mechanisms.  - Encourage caregiver to participate in  daily care.  - Assess support systems available to mother/family.  - Provide /case management support as needed.  Outcome: Progressing

## 2024-04-21 NOTE — PLAN OF CARE
Problem: BIRTH - VAGINAL/ SECTION  Goal: Fetal and maternal status remain reassuring during the birth process  Description: INTERVENTIONS:  - Monitor vital signs  - Monitor fetal heart rate  - Monitor uterine activity  - Monitor labor progression (vaginal delivery)  - DVT prophylaxis (C/S delivery)  - Surgical antibiotic prophylaxis (C/S delivery)  Outcome: Completed     Problem: PAIN - ADULT  Goal: Verbalizes/displays adequate comfort level or patient's stated pain goal  Description: INTERVENTIONS:  - Encourage pt to monitor pain and request assistance  - Assess pain using appropriate pain scale  - Administer analgesics based on type and severity of pain and evaluate response  - Implement non-pharmacological measures as appropriate and evaluate response  - Consider cultural and social influences on pain and pain management  - Manage/alleviate anxiety  - Utilize distraction and/or relaxation techniques  - Monitor for opioid side effects  - Notify MD/LIP if interventions unsuccessful or patient reports new pain  - Anticipate increased pain with activity and pre-medicate as appropriate  Outcome: Completed     Problem: ANXIETY  Goal: Will report anxiety at manageable levels  Description: INTERVENTIONS:  - Administer medication as ordered  - Teach and rehearse alternative coping skills  - Provide emotional support with 1:1 interaction with staff  Outcome: Completed     Problem: Patient/Family Goals  Goal: Patient/Family Long Term Goal  Description: Patient's Long Term Goal: Uncomplicated vaginal delivery     Interventions:   VS per protocol   I&O   Ice chips and sips as tolerated   EFM per protocol   Maintain IV as ordered   Antibiotics as needed per protocol   Informed consent   - See additional Care Plan goals for specific interventions  Outcome: Completed  Goal: Patient/Family Short Term Goal  Description: Patient's Short Term Goal: pain management     Interventions:   - breathing  techniques  -repositioning  -relaxation techniques   - See additional Care Plan goals for specific interventions  Outcome: Completed

## 2024-04-21 NOTE — PLAN OF CARE

## 2024-04-21 NOTE — PROGRESS NOTES
Patient admitted to mother baby. ID bands checked. Discharge teaching initiated. Call light in reach. Oriented to room.

## 2024-04-21 NOTE — PROGRESS NOTES
S: pt without complaints.  Lochia light  O: VS-Temp:  [97.8 °F (36.6 °C)-99.3 °F (37.4 °C)] 99.3 °F (37.4 °C)  Pulse:  [] 68  Resp:  [16-20] 16  BP: ()/(39-83) 118/61  SpO2:  [97 %-99 %] 98 %       Abdomen- soft ND NT, uterus firm and contracted       Extremities- no edema, NT bilateral lower extremities       CBC-   A/P:       1.  PPD #1 s/p        2.  Doing well       3.  Home tomorrow

## 2024-04-21 NOTE — PROGRESS NOTES
Labor Analgesia Follow Up Note    Patient underwent epidural anesthesia for labor analgesia,    Placenta Date/Time: 4/20/2024  6:49 PM     Delivery Date/Time:: 4/20/2024   6:46 PM     /75 (BP Location: Right arm)   Pulse 82   Temp 97.9 °F (36.6 °C) (Oral)   Resp 17   Ht 1.676 m (5' 6\")   Wt 79.8 kg (176 lb)   LMP 07/24/2023   SpO2 98%   Breastfeeding Yes   BMI 28.41 kg/m²     Assessment:  Patient seen and no apparent anesthesia related complications.    Thank you for asking us to participate in the care of your patient.

## 2024-04-21 NOTE — PROGRESS NOTES
Pt transferred to Mother Baby room 2213 after voiding in stable condition. Report given to JOSE R Ledesma RN. Infant transferred with mother in stable condition.

## 2024-04-22 VITALS
RESPIRATION RATE: 18 BRPM | OXYGEN SATURATION: 98 % | HEIGHT: 66 IN | HEART RATE: 80 BPM | BODY MASS INDEX: 28.28 KG/M2 | TEMPERATURE: 98 F | SYSTOLIC BLOOD PRESSURE: 120 MMHG | WEIGHT: 176 LBS | DIASTOLIC BLOOD PRESSURE: 75 MMHG

## 2024-04-22 PROCEDURE — 90471 IMMUNIZATION ADMIN: CPT

## 2024-04-22 NOTE — LACTATION NOTE
This note was copied from a baby's chart.  LACTATION NOTE - INFANT    Evaluation Type  Evaluation Type: Inpatient    Problems & Assessment  Problems Diagnosed or Identified: Sleepy  Problems: comment/detail: Edema resolving -  Infant Assessment: Minimal hunger cues present;Skin color: pink or appropriate for ethnicity;Good skin turgor  Muscle tone: Appropriate for GA    Feeding Assessment  Summary Current Feeding: Breastfeeding exclusively  Last 24 hour feeding summary: see I and O  Breastfeeding Assessment: Assisted with breastfeeding w/mother's permission;No sustained latch to breast;Sleepy infant, quickly pacifies (sleepy baby - assisted with mother led positoning and latching)  Breastfeeding lasted # of minutes: 0 (attempt to latch w/o success)  Breastfeeding Positions: football;right breast  Latch: Too sleepy or reluctant, no latch achieved  Other (comment): Assisted to latch baby at the breast, mother led latch on with use of supportive pillows. Mother reports she has a MyBrestFriend pillow at home, enc to utililize if to facilitate a supported infant position. Mother reports a much more comfortable position, however baby sleepy at the time of the consult and a nutrative sucking pattern was not observed. Enc skin to skin care. Reviewed breastfeeding discharge instructions at length. Desired and scheduled LC OPV visi 4-29-24 @ 12:30 pm. instructions reviewed for visit- enc to call for any lactation concerns in the interim.

## 2024-04-22 NOTE — PROGRESS NOTES
Patient complaints: none. Pain managed.     Vital signs reviewed    Examination:  General: alert, appropriate affect  Abdomen: Fundus firm and no unexpected tenderness.  Remainder of abdomen unremarkable  Lochia: appropriate  Extremities: nontender, no excessive edema, symmetric        Impression:   Postpartum day #2 from vaginal birth, recuperating appropriately, anticipate routine discharge  Home today

## 2024-04-22 NOTE — PROGRESS NOTES
NURSING DISCHARGE NOTE    Discharged Home via Ambulatory.  Accompanied by Family member  Belongings Taken by patient/family.

## 2024-04-24 ENCOUNTER — TELEPHONE (OUTPATIENT)
Dept: OBGYN UNIT | Facility: HOSPITAL | Age: 29
End: 2024-04-24

## 2024-04-27 ENCOUNTER — TELEPHONE (OUTPATIENT)
Dept: OBGYN UNIT | Facility: HOSPITAL | Age: 29
End: 2024-04-27

## 2024-04-29 ENCOUNTER — NURSE ONLY (OUTPATIENT)
Dept: LACTATION | Facility: HOSPITAL | Age: 29
End: 2024-04-29
Attending: OBSTETRICS & GYNECOLOGY
Payer: COMMERCIAL

## 2024-04-29 DIAGNOSIS — O92.5 SUPPRESSED LACTATION, POSTPARTUM CONDITION OR COMPLICATION (HCC): Primary | ICD-10-CM

## 2024-04-29 PROCEDURE — 99213 OFFICE O/P EST LOW 20 MIN: CPT

## 2024-04-29 NOTE — PATIENT INSTRUCTIONS
Breastfeeding suggestions when supplements may be needed    Kangaroo mother care: Snuggle your baby between your breasts in just a diaper and covered with a blanket. Helps to wake a sleepy baby and increases your milk supply.     Massage your breasts before nursing or pumping.    Breastfeed with hunger cues, most babies will breastfeed 8-12 times every 24 hours with some cluster feeding, especially during growth spurts. Gently wake by 2-3 hours to feed if sleepy.    Positioning:   Your hand at neck/shoulders, not the back of head.   Line chin with the bottom of your areola    Latching on:  Express drops of milk onto your baby's lips to encourage licking.  Point your nipple to baby's nose  Stroke nipple lightly down center of lips  Wait for wide mouth with tongue cupped at bottom of mouth.  Chin should be deep into breast, with some room between nose and the breast.   If needed, gently draw chin down lower to deepen latch.    Is baby taking enough breastmilk?  Swallowing with most sucks (every 1-3 sucks) until satisfied at least 8-12 times every 24 hours.  Compressing the breast when your baby sucks can increase milk flow.  At least 6-8 wet diapers and at least 3-4 soft, yellow seedy stools every 24 hours. Use breastfeeding journal.  Weight gain of at least 4-7 ounces per week    Supplementation:   If not meeting these guidelines for adequate breastfeeding, feed 1-2 oz or more expressed milk or formula with a wide based, slow flow nipple or the SNS (supplemental nursing system).     Paced bottle feeding using a slow flow nipple:   Hold your baby in an upright position, supporting the hand and neck with your hand, rather than in the crook of your arm.   Let you baby \"latch on\" to bottle: stroke nipple down from top lip to bottom, licking is good, wait for wide mouth, tongue cupped at bottom of mouth.  Tip the bottle up just far enough that there is not air in the nipple.  Pausing mimics breastfeeding and discourages  \"guzzling\" the feeding, allowing infant to take at least 15 minutes to drink the breastmilk or formula.     Milk Supply:   ontinue breast massage before nursing and pumping, skin to skin contact with baby as much as possible.   Continue to pump one or both breasts for 10-15 minutes every 2-3 hours after nursing. (at least 8x/24 hours). A hospital grade rental breast pump is recommended.   If milk supply is not responding to above measures within the week:  Discuss use of herbs such as fenugreek  or medications such as reglan or domperidone with your OB physician and baby's physician.  Discuss thyroid check with OB physician         Prevent plugged ducts and mastitis  Watch for signs of breast infection (mastitis) - painful breast, reddened area, fever, chills or flu-like symptoms - call your OB doctor at once if this occurs.     Follow-up:  Call lactation [phone] in 1-2 days and as needed.   Schedule follow-up lactation consult within week and as needed.   Appointment with baby's doctor planned        Call you baby's doctor with questions as well.    Weight check sooner if wet or stool diapers decrease. Have weight checked again within 1-3 days of decreasing/stopping supplements.     For additional information: [employer] website  www.llli.org      [facility]  Our Lactation Consultants are available to continue helping you breastfeed.  To schedule an appointment at our office  Call [phone]    Suggestions to increase milk supply and release to breast pump    Review of your history and treatment of any medical conditions by your physician is also necessary.    Kangaroo mother care: Snuggle with your baby in skin to skin contact.  This helps to wake a sleepy baby and increases your milk supply.     Massage your breasts before nursing or pumping.  Practice relaxation techniques like visual imagery.    Increase the frequency of feedings and/or pumping sessions.    Most babies  will feed 8-12 times in 24 hours with some periods of cluster feeding, therefore try to increase pumping frequency to at least 8-12 times every 24 hours.    Keep pumping log with 24 hour collection totals to monitor milk supply.  Once your milk is in (3-5 days post-delivery), pump until the sprays of milk slow to drops and for 1-2 minutes after to obtain the high fat milk.  This for most moms is 10-12 minutes, but pumping times may vary slightly.  A short pumping is better than no pumping!  If you have time you can “cluster pump” like a baby cluster feeds at times - pump for ten minutes, rest for ten minutes, then repeat 2-3 times. Or try pumping every hour for 10 minutes for 2-3 hours.    Pumping should not be painful.   Place flange on your breasts, centering your nipples.    Use correct size flange for your nipple size. Nipple should not rub on inner circumference of flange. If you need a different size flange, contact your nurse or the lactation department.   Maximum comfort suction is recommended. Begin with suction low then increase the suction to the highest suction that is comfortable for you. Remember pumping should never be painful.  If breast milk flow is minimal, try increasing suction if it is comfortable to do so.  NOTE: Initially, in the colostrum phase amounts vary from a few drops to 30cc (1oz.).  If pumping is painful, turn the pump off, reposition flanges, check that the size is correct for your nipples, and adjust the suction. If nipple pain continues contact the lactation department or your doctor.    Ways to help your milk let down (flow) to the pump:   Massage your breasts for a few minutes prior to pumping and massage again if the milk flow slows down during the pumping session.   Hand expression of milk before, during and after pumping may allow you to obtain more milk than the pump alone.   When milk flow slows, increasing pump speed  back to 80 cpm (Ameda Jennings) or switching pump back  to “stimulation” phase (Medela pumps) to stimulate further milk ejection reflexes. Then decrease speed once milk begins to flow again.   Pump after you’ve seen, held, or touched your baby. Discuss “kangaroo care” with your baby’s nurse - holding your baby skin-to-skin on your chest when your baby is able.  Pump with baby close by or have a picture of your baby to look at while you pump  Inhale the scent of your baby from something your baby wore.  Sit back, close your eyes and imagine how sweet and soft your baby is; imagine “flowing things” like waterfalls, white rivers.  Listen to relaxing music. There is relaxation/meditation CD/tapes made especially for mothers pumping their breast milk.   Have a nice tall glass of water, juice, or milk close by to quench your thirst.  View the [facility] website videos: Maximizing Milk Production and Hand Expression   [email or web address]    Include night feedings and/or pumping sessions. Your hormone levels are higher at night.    Increasing milk flow to baby if breastfeeding:   Improve your baby’s position and latch.  Positioning:   Your hand at neck/shoulders, not the back of head.   Line chin with the bottom of your areola  Latching on:  Express drops of milk onto your baby’s lips to encourage licking.  Point your nipple to baby’s nose and stroke lightly down the center of lips.  Wait for wide mouth with tongue cupped at bottom of mouth.  Chin should be deep into breast, with some room between nose and the breast.   If needed, gently draw chin down lower to deepen latch.  Compressing the breast when your baby sucks can increase milk flow.  Swallowing with most sucks (every 1-3 sucks) until satisfied at least 8-12 times every 24 hours.    Additional recommendations can be made on an individual basis depending on needs.  If you would like to meet with one of the Lactation Consultants while visiting your baby, you can request a  visit by calling [phone] or notify your baby’s nurse for arrangements to be made.

## 2024-05-14 ENCOUNTER — NURSE ONLY (OUTPATIENT)
Dept: LACTATION | Facility: HOSPITAL | Age: 29
End: 2024-05-14
Attending: OBSTETRICS & GYNECOLOGY

## 2024-05-14 ENCOUNTER — LACTATION ENCOUNTER (OUTPATIENT)
Dept: LACTATION | Facility: HOSPITAL | Age: 29
End: 2024-05-14

## 2024-05-14 VITALS — TEMPERATURE: 98 F

## 2024-05-14 DIAGNOSIS — O92.5 SUPPRESSED LACTATION, POSTPARTUM CONDITION OR COMPLICATION (HCC): ICD-10-CM

## 2024-05-14 PROCEDURE — 99212 OFFICE O/P EST SF 10 MIN: CPT

## 2024-05-14 NOTE — LACTATION NOTE
This note was copied from a baby's chart.  LACTATION NOTE - INFANT    Evaluation Type  Evaluation Type: Outpatient Follow Up    Problems & Assessment  Problems Diagnosed or Identified:  feeding problem;Disorganized suck;Sleepy;37-38 weeks gestation  Problems: comment/detail: Infant weighed 7 lbs 4.6 oz today at 3 weeks old. She had an adequate weight gain of 588g (1 lb 4.5 oz) since infant's initial LC consult on 24, 16 days ago. Mother has been concentrating on pumping and bottle feeding infant EBM with additional formula. Mother has a low milk supply and is trying suggestions to increase her milk supply. Infant is a sleepy feeder. Mother is taking Zoloft (L2) which may be contributing to infant's sleepiness. Infant's suck is disorganized with a biting motion, infant is not maintaining a strong seal on the bottle nipple, LC could easily withdraw the bottle nipple from infant's mouth when infant was being supplemented with a bottle of formula after breastfeeding.   is recommending infant be seen by SLP for a sucking evaluation.  Infant Assessment: Anterior fontanel soft and flat;Abdomen soft, non-distended;Bowel sounds active;Good skin turgor;Hunger cues present;Oral mucous membranes moist;Skin color: pink or appropriate for ethnicity (Sucking blister, white coating on tongue)  Muscle tone: Appropriate for GA    Feeding Assessment  Summary Current Feeding: Breastfeeding with breast milk supplement;Breastfeeding with formula supplement  Last 24 hour feeding summary: BF 1, Bottle 9 ( ml)  Breastfeeding Assessment: Assisted with breastfeeding w/mother's permission;Deep latch achieved and observed;Sleepy infant, quickly pacifies;PO supplement followed breastfeeding  Breastfeeding lasted # of minutes: 25  Breastfeeding Positions: left breast;right breast;cross cradle  Latch: Grasps breast, tongue down, lips flanged, rhythmic sucking  Audible Sucks/Swallows: A few with stimulation  Type of Nipple:  Everted (after stimulation)  Comfort (Breast/Nipple): Soft/non-tender  Hold (Positioning): Full assist, teach one side, mother does other, staff holds  LATCH Score: 8  Other (comment): Assisted with guiding infant to a deep latch, infant was sleepy at breast and needed gentle stimulation throughout BF to have infant continue actively sucking. Enc mother to continue to feed infant on demand, wake by 2-3 hrs for feedings. Offer 2+oz EBM/formula to infant's satiety. Increase the amt of supplements if infant is not having at least 6-8+ wet diapers. Call ped with any feeding difficulties or concerns. Mother stated that infant will have 1 month check up next week and plans to discuss the Tongue Tie handout and recommendation for infant to be seen by SLP for a sucking evaluation. Enc to call LC and schedule a f/u LC OPV for 1-2 wks. Call sooner prn.    Output  # Voids in 24 hours: 8  # Stools in 24 hours: 2    Pre/Post Weights  Pre-Weight Right Breast (g): 3324  Post-Weight Right Breast (g): 3336  ml of milk, RT Brst: 12  Pre-Weight Left Breast (g): 3306  Post-Weight Left Breast (g): 3324  ml of milk, LT Brst: 18  ml of milk, total: 30  Supplement Type: EBM  Supplement total, ml: 60  Feeding total ml: 90

## 2024-05-14 NOTE — PATIENT INSTRUCTIONS
Kaleigh weighed 7 lbs 4.6 oz today at 3 weeks old. She transferred  30 ml of breast milk, 18 ml from the Left breast and 22 ml from the Right breast and was given 60 ml of pumped breast milk after breastfeeding    This week   Continue skin to skin care, feed Kaleigh on demand and wake up by 2-3 hours for feedings. Offer practice sessions of breastfeeding 2-4 times a day, for short periods of time, up to 20 minutes while Kaleigh continues her sleepy behaviors. Use your breast pump for 20 minutes every 2-3 hours to help increase your milk supply.    Kaleigh is showing signs that her suck is disorganized and she is having difficulty maintaining a good seal when she is sucking. I am recommending she be seen by a speech/myofunctional therapist for a sucking evaluation, and encourage you to call your pediatrician and discuss this recommendation.    Breastfeeding suggestions when supplements are needed    Kangaroo mother care: Snuggle your baby between your breasts in just a diaper and covered with a blanket. Helps to wake a sleepy baby and increases your milk supply.     Massage your breasts before nursing or pumping.    Breastfeed with hunger cues, most babies will breastfeed 8-12 times every 24 hours with some cluster feeding, especially during growth spurts. Gently wake by 2-3 hours to feed if sleepy.    Positioning:   Your hand at neck/shoulders, not the back of head.   Line chin with the bottom of your areola    Latching on:  Express drops of milk onto your baby's lips to encourage licking.  Point your nipple to baby's nose  Stroke nipple lightly down center of lips  Wait for wide mouth with tongue cupped at bottom of mouth.  Chin should be deep into breast, with some room between nose and the breast.   If needed, gently draw chin down lower to deepen latch.    Is baby taking enough breastmilk?  Swallowing with most sucks (every 1-3 sucks) until satisfied at least 8-12 times every 24 hours. (LASTING 15-30 MINUTES BETWEEN  BOTH BREASTS)  Compressing the breast when your baby sucks can increase milk flow.  At least 6-8 wet diapers and at least 3-4 soft, yellow seedy stools every 24 hours. Use breastfeeding journal.  Weight gain of at least 5-7 ounces per week    Supplementation:         If your baby is not meeting these guidelines for adequate breastfeeding, feed 1-2 oz or more expressed milk or formula with a wide based, slow flow nipple.        Increase the amount of supplement until infant is not having at least 6-8 wet diapers and stools within 24 hrs.    Paced bottle feeding using a slow flow nipple:   Hold your baby in an upright position, supporting the hand and neck with your hand, rather than in the crook of your arm.   Let you baby \"latch on\" to bottle: stroke nipple down from top lip to bottom, licking is good, wait for wide mouth, tongue cupped at bottom of mouth.  Tip the bottle up just far enough that there is not air in the nipple.  Pausing mimics breastfeeding and discourages \"guzzling\" the feeding, allowing infant to take at least 15 minutes to drink the breastmilk or formula.     Milk Supply Increase strategies:  Massage your breasts before nursing and/or pumping and provide skin to skin contact with your baby as much as possible.   Pump both breasts for 15-20 minutes every 2-3 hours after nursing. (at least 8x/24 hours).   I encourage you use your Spectra breast pump  Use the hands on pumping technique to help release more milk when you are pumping  Consider adding 1-2 sessions of cluster pumping    Prevent plugged ducts and mastitis  Watch for signs of breast infection (mastitis) - painful breast, reddened area, fever, chills or flu-like symptoms - call your OB doctor at once if this occurs.     Follow-up:  Call your pediatrician with any feeding difficulties or concerns.   Keep your appointment with your baby's pediatrician as planned (1 month check up) next week  Call the [facility] at [phone] with breastfeeding questions as needed.  Follow up with the Lactation Consultant as needed or in 1 week after speech/myofunctional therapist evaluation    Weight check sooner if wet or stool diapers decrease. Have weight checked again within 1-3 days of decreasing/stopping supplements.     For additional information: [employer] website    Www.llli.org  [email or web address]  www.[email or web address].org  Www.[email or web address]

## 2024-05-14 NOTE — LACTATION NOTE
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 Pt has a low milk supply and suggestions were discussed to help increase her milk supply. Pt has some nipple pain from pumping and reviewed pumping practices and made adjustments to her pumping techniques and pt denied discomfort with pumping. Infant is a sleepy feeder with a disorganized suck.    Maternal history  Maternal history: Anxiety;Depression;Induction of labor;Anemia  Other/comment: Hx leukemia  2005, vit D def, ADD- On ZOLOFT - L2 per Mariano Michel    Breastfeeding goal  Breastfeeding goal: To maintain breast milk feeding per patient goal    Maternal Assessment  Bilateral Breasts: Soft;Symmetrical  Bilateral Nipples: Slightly everted/short  Prior breastfeeding experience (comment below): Primip  Breastfeeding Assistance: Breast exam provided with permission;Hand expression provided with permission;Breastfeeding assistance provided with permission;Pumping assistance provided with permission    Pain assessment  Pain, additional: Pain w/pumping  Location/Comment: LC recommended using a hospital grade pump and adjusting the pump strength to her comfort level  Treatment of Sore Nipples: Deeper latch techniques;Expressed breast milk    Guidelines for use of:  Breast pump type: Spectra;Medela Pump In Style MaxFlow;[place]  Current use of pump:: 5 times a day  Suggested use of pump: Pump 8-12X/24hr;Pump each time a supplement is offered  Reported pumping volumes (ml): 330 in the past 24 hrs  Post-feed pumped volume: 28  Other (comment): Assisted with breast massage/HE and BF. Infant is a sleepy feeder with an ineffective suck. Pt has been focusing on pumping and  bottle feeding and her milk supply is unable to keep up with her baby's needs. Pt has been pumping approx 5 times a day and using her [place] pump. She stated she has difficulty positioning her hands free pump in her bra and she is having some nipple soreness on L nipple. No trauma was noted. Enc to use her spectra breast pump which she did not bring to the visit today. Set up the ameda platinum breast pump and reviewed setting. Discussed proper fit of flanges and pt measured R 24mm and the L22mm. A flexishield insert was placed in her L flange, however pt felt more comfortable pumping using the 25mm ameda flanges. Reviewed adjusting the pump strength to her comfort level and pt denied nipple soreness with pumping. Reviewed the differences between the ameda breast pump and the Spectra breast pump and pt agreed to use the Spectra breast pump at home. Discussed ways to increase her milk supply including increasing her pumping frequency, using hands on pumping techniques and adding 1-2 sessions of cluster pumping. Discussed infant has a disorganized suck and LC enc a SLP evaluation. A handout was given to the pt and enc to discuss information with the pediatrician. Enc LC OP follow up in 1-2 weeks. Enc to call sooner with any questions or concerns.

## 2025-08-28 RX ORDER — HEPARIN SODIUM 5000 [USP'U]/ML
5000 INJECTION, SOLUTION INTRAVENOUS; SUBCUTANEOUS ONCE
Status: CANCELLED | OUTPATIENT
Start: 2025-08-28 | End: 2025-08-28

## 2025-08-29 ENCOUNTER — HOSPITAL ENCOUNTER (OUTPATIENT)
Facility: HOSPITAL | Age: 30
Setting detail: HOSPITAL OUTPATIENT SURGERY
Discharge: HOME | End: 2025-08-29
Attending: OBSTETRICS & GYNECOLOGY | Admitting: OBSTETRICS & GYNECOLOGY

## 2025-08-29 ENCOUNTER — APPOINTMENT (OUTPATIENT)
Dept: ULTRASOUND IMAGING | Facility: HOSPITAL | Age: 30
End: 2025-08-29
Attending: OBSTETRICS & GYNECOLOGY

## 2025-08-29 ENCOUNTER — ANESTHESIA (OUTPATIENT)
Dept: SURGERY | Facility: HOSPITAL | Age: 30
End: 2025-08-29

## 2025-08-29 ENCOUNTER — ANESTHESIA EVENT (OUTPATIENT)
Dept: SURGERY | Facility: HOSPITAL | Age: 30
End: 2025-08-29

## 2025-08-29 VITALS
RESPIRATION RATE: 16 BRPM | HEART RATE: 63 BPM | HEIGHT: 66 IN | OXYGEN SATURATION: 100 % | BODY MASS INDEX: 24.17 KG/M2 | SYSTOLIC BLOOD PRESSURE: 119 MMHG | DIASTOLIC BLOOD PRESSURE: 62 MMHG | TEMPERATURE: 98 F | WEIGHT: 150.38 LBS

## 2025-08-29 DIAGNOSIS — O02.1 MISSED AB (HCC): ICD-10-CM

## 2025-08-29 LAB
ERYTHROCYTE [DISTWIDTH] IN BLOOD BY AUTOMATED COUNT: 12.2 %
HCT VFR BLD AUTO: 37.7 % (ref 35–48)
HGB BLD-MCNC: 13.4 G/DL (ref 12–16)
MCH RBC QN AUTO: 32.1 PG (ref 26–34)
MCHC RBC AUTO-ENTMCNC: 35.5 G/DL (ref 31–37)
MCV RBC AUTO: 90.2 FL (ref 80–100)
PLATELET # BLD AUTO: 283 10(3)UL (ref 150–450)
RBC # BLD AUTO: 4.18 X10(6)UL (ref 3.8–5.3)
WBC # BLD AUTO: 9.7 X10(3) UL (ref 4–11)

## 2025-08-29 PROCEDURE — 88262 CHROMOSOME ANALYSIS 15-20: CPT | Performed by: OBSTETRICS & GYNECOLOGY

## 2025-08-29 PROCEDURE — 88233 TISSUE CULTURE SKIN/BIOPSY: CPT | Performed by: OBSTETRICS & GYNECOLOGY

## 2025-08-29 PROCEDURE — 85027 COMPLETE CBC AUTOMATED: CPT | Performed by: OBSTETRICS & GYNECOLOGY

## 2025-08-29 PROCEDURE — 88291 CYTO/MOLECULAR REPORT: CPT | Performed by: OBSTETRICS & GYNECOLOGY

## 2025-08-29 PROCEDURE — 76998 US GUIDE INTRAOP: CPT | Performed by: OBSTETRICS & GYNECOLOGY

## 2025-08-29 RX ORDER — NALOXONE HYDROCHLORIDE 0.4 MG/ML
0.08 INJECTION, SOLUTION INTRAMUSCULAR; INTRAVENOUS; SUBCUTANEOUS AS NEEDED
Status: DISCONTINUED | OUTPATIENT
Start: 2025-08-29 | End: 2025-08-29

## 2025-08-29 RX ORDER — ACETAMINOPHEN 500 MG
1000 TABLET ORAL ONCE AS NEEDED
Status: COMPLETED | OUTPATIENT
Start: 2025-08-29 | End: 2025-08-29

## 2025-08-29 RX ORDER — HYDROCODONE BITARTRATE AND ACETAMINOPHEN 5; 325 MG/1; MG/1
1 TABLET ORAL ONCE AS NEEDED
Status: COMPLETED | OUTPATIENT
Start: 2025-08-29 | End: 2025-08-29

## 2025-08-29 RX ORDER — ACETAMINOPHEN 500 MG
1000 TABLET ORAL ONCE
Status: DISCONTINUED | OUTPATIENT
Start: 2025-08-29 | End: 2025-08-29 | Stop reason: HOSPADM

## 2025-08-29 RX ORDER — PROCHLORPERAZINE EDISYLATE 5 MG/ML
5 INJECTION INTRAMUSCULAR; INTRAVENOUS EVERY 8 HOURS PRN
Status: DISCONTINUED | OUTPATIENT
Start: 2025-08-29 | End: 2025-08-29

## 2025-08-29 RX ORDER — MEPERIDINE HYDROCHLORIDE 25 MG/ML
12.5 INJECTION INTRAMUSCULAR; INTRAVENOUS; SUBCUTANEOUS
Status: DISCONTINUED | OUTPATIENT
Start: 2025-08-29 | End: 2025-08-29

## 2025-08-29 RX ORDER — KETOROLAC TROMETHAMINE 30 MG/ML
INJECTION, SOLUTION INTRAMUSCULAR; INTRAVENOUS AS NEEDED
Status: DISCONTINUED | OUTPATIENT
Start: 2025-08-29 | End: 2025-08-29 | Stop reason: SURG

## 2025-08-29 RX ORDER — ONDANSETRON 2 MG/ML
4 INJECTION INTRAMUSCULAR; INTRAVENOUS EVERY 6 HOURS PRN
Status: DISCONTINUED | OUTPATIENT
Start: 2025-08-29 | End: 2025-08-29

## 2025-08-29 RX ORDER — SCOPOLAMINE 1 MG/3D
1 PATCH, EXTENDED RELEASE TRANSDERMAL ONCE
Status: DISCONTINUED | OUTPATIENT
Start: 2025-08-29 | End: 2025-08-29 | Stop reason: HOSPADM

## 2025-08-29 RX ORDER — SODIUM CHLORIDE, SODIUM LACTATE, POTASSIUM CHLORIDE, CALCIUM CHLORIDE 600; 310; 30; 20 MG/100ML; MG/100ML; MG/100ML; MG/100ML
INJECTION, SOLUTION INTRAVENOUS CONTINUOUS
Status: DISCONTINUED | OUTPATIENT
Start: 2025-08-29 | End: 2025-08-29

## 2025-08-29 RX ORDER — LIDOCAINE HYDROCHLORIDE 10 MG/ML
INJECTION, SOLUTION EPIDURAL; INFILTRATION; INTRACAUDAL; PERINEURAL AS NEEDED
Status: DISCONTINUED | OUTPATIENT
Start: 2025-08-29 | End: 2025-08-29 | Stop reason: SURG

## 2025-08-29 RX ORDER — HYDROMORPHONE HYDROCHLORIDE 1 MG/ML
0.6 INJECTION, SOLUTION INTRAMUSCULAR; INTRAVENOUS; SUBCUTANEOUS EVERY 5 MIN PRN
Status: DISCONTINUED | OUTPATIENT
Start: 2025-08-29 | End: 2025-08-29

## 2025-08-29 RX ORDER — HYDROMORPHONE HYDROCHLORIDE 1 MG/ML
0.4 INJECTION, SOLUTION INTRAMUSCULAR; INTRAVENOUS; SUBCUTANEOUS EVERY 5 MIN PRN
Status: DISCONTINUED | OUTPATIENT
Start: 2025-08-29 | End: 2025-08-29

## 2025-08-29 RX ORDER — HYDROMORPHONE HYDROCHLORIDE 1 MG/ML
0.2 INJECTION, SOLUTION INTRAMUSCULAR; INTRAVENOUS; SUBCUTANEOUS EVERY 5 MIN PRN
Status: DISCONTINUED | OUTPATIENT
Start: 2025-08-29 | End: 2025-08-29

## 2025-08-29 RX ORDER — MIDAZOLAM HYDROCHLORIDE 1 MG/ML
1 INJECTION INTRAMUSCULAR; INTRAVENOUS EVERY 5 MIN PRN
Status: DISCONTINUED | OUTPATIENT
Start: 2025-08-29 | End: 2025-08-29

## 2025-08-29 RX ORDER — ONDANSETRON 2 MG/ML
INJECTION INTRAMUSCULAR; INTRAVENOUS AS NEEDED
Status: DISCONTINUED | OUTPATIENT
Start: 2025-08-29 | End: 2025-08-29 | Stop reason: SURG

## 2025-08-29 RX ORDER — METHYLERGONOVINE MALEATE 0.2 MG/ML
INJECTION INTRAVENOUS AS NEEDED
Status: DISCONTINUED | OUTPATIENT
Start: 2025-08-29 | End: 2025-08-29 | Stop reason: SURG

## 2025-08-29 RX ORDER — HYDROCODONE BITARTRATE AND ACETAMINOPHEN 5; 325 MG/1; MG/1
2 TABLET ORAL ONCE AS NEEDED
Status: COMPLETED | OUTPATIENT
Start: 2025-08-29 | End: 2025-08-29

## 2025-08-29 RX ADMIN — KETOROLAC TROMETHAMINE 30 MG: 30 INJECTION, SOLUTION INTRAMUSCULAR; INTRAVENOUS at 15:15:00

## 2025-08-29 RX ADMIN — METHYLERGONOVINE MALEATE 0.2 MG: 0.2 INJECTION INTRAVENOUS at 15:21:00

## 2025-08-29 RX ADMIN — SODIUM CHLORIDE, SODIUM LACTATE, POTASSIUM CHLORIDE, CALCIUM CHLORIDE: 600; 310; 30; 20 INJECTION, SOLUTION INTRAVENOUS at 14:50:00

## 2025-08-29 RX ADMIN — LIDOCAINE HYDROCHLORIDE 50 MG: 10 INJECTION, SOLUTION EPIDURAL; INFILTRATION; INTRACAUDAL; PERINEURAL at 14:53:00

## 2025-08-29 RX ADMIN — SODIUM CHLORIDE, SODIUM LACTATE, POTASSIUM CHLORIDE, CALCIUM CHLORIDE: 600; 310; 30; 20 INJECTION, SOLUTION INTRAVENOUS at 15:16:00

## 2025-08-29 RX ADMIN — ONDANSETRON 4 MG: 2 INJECTION INTRAMUSCULAR; INTRAVENOUS at 14:54:00

## (undated) NOTE — LETTER
Date & Time: 11/29/2021, 9:41 AM  Patient: Cheryle Goldman  Encounter Provider(s):    SIMON Richardson       To Whom It May Concern:    Lamin Tidwell was seen and treated in our department on 11/29/2021.  She should not return to work until November 30

## (undated) NOTE — LETTER
Date & Time: 11/29/2021, 9:42 AM  Patient: Jaclyn Mckeon  Encounter Provider(s):    SIMON Shetty       To Whom It May Concern:    Zina Drummond was seen and treated in our department on 11/29/2021.  She Please allow this individual to wear tennis